# Patient Record
Sex: FEMALE | URBAN - METROPOLITAN AREA
[De-identification: names, ages, dates, MRNs, and addresses within clinical notes are randomized per-mention and may not be internally consistent; named-entity substitution may affect disease eponyms.]

---

## 2019-02-01 ENCOUNTER — TRANSFERRED RECORDS (OUTPATIENT)
Dept: HEALTH INFORMATION MANAGEMENT | Facility: CLINIC | Age: 44
End: 2019-02-01

## 2019-05-14 ENCOUNTER — TRANSFERRED RECORDS (OUTPATIENT)
Dept: HEALTH INFORMATION MANAGEMENT | Facility: CLINIC | Age: 44
End: 2019-05-14

## 2019-05-16 ENCOUNTER — MEDICAL CORRESPONDENCE (OUTPATIENT)
Dept: HEALTH INFORMATION MANAGEMENT | Facility: CLINIC | Age: 44
End: 2019-05-16

## 2019-05-30 ENCOUNTER — TELEPHONE (OUTPATIENT)
Dept: GASTROENTEROLOGY | Facility: CLINIC | Age: 44
End: 2019-05-30

## 2019-05-30 NOTE — TELEPHONE ENCOUNTER
[facility]    Phone Message    May a detailed message be left on voicemail: yes    Reason for Call: Other: Shawna from [facility] in [place] is calling to update this pts referral status to urgent, stating that the pts condition is rapidly deteriorating, and she has had to make several trips to the ED in [place] recently. Please call her at [phone] for more details if needed. Thank you!     Action Taken: Message routed to:  [facility] ([facility]): JIM

## 2019-06-13 ENCOUNTER — DOCUMENTATION ONLY (OUTPATIENT)
Dept: GASTROENTEROLOGY | Facility: CLINIC | Age: 44
End: 2019-06-13

## 2019-06-13 NOTE — PROGRESS NOTES
Referring Provider and Clinic:  Y- Dr. Denver Ulland [place], [place]    Diagnosis:  H. Pylori    GI notes or primary provider notes related to GI problem:   Y- 5.14.19     Pathology Reports:     Recent Lab Reports: N    Radiology Reports (CT/MRI) : N/A    Endoscopy:  N    Colonoscopy: N    Other:  12.22.10 Gastric-Emptying Study    Referral Date: 5.16.19    Date complete records received and sent for review:     Date records scanned into epic: 6.17.19    Pt with epigastric pain, attributed to H pylori.   Treated in Jan with PPI, amox, clarithromycin.  Stool Ag positive thereafter.  2nd round treatment: PPI, bismuth, doxycycline, metronidazole  Stool Ag positive in May.  3rd round treatment: PPI, amoxicillin, levofloxacin    Pt referred to GI locally and seen.   Reportedly colonoscopy pursued due to report of weight loss.   No GI records available, no follow-up on most recent H pylori stool Ag.     Provider Review Date:  June- July 2019   **No additional records received from GI clinic visits, labs ( H pylori stool Ag after 3rd round of treatment), or colonoscopy (done through local GI).     Aug - Sept 2019  ** No additional records received.       Date review routed back to sender: 10/5/2019    We are still missing information from the medical record and the referral cannot be completed. This referral request will be closed. If referral is still required will need a new referral order and all available records.     Letter sent:     Notes:                     Darion Charles 6.17.19 11:00 am   Action Taken Attempted to contact patient x3 (line busy).